# Patient Record
Sex: MALE | Race: WHITE | NOT HISPANIC OR LATINO | Employment: FULL TIME | URBAN - METROPOLITAN AREA
[De-identification: names, ages, dates, MRNs, and addresses within clinical notes are randomized per-mention and may not be internally consistent; named-entity substitution may affect disease eponyms.]

---

## 2024-11-07 ENCOUNTER — APPOINTMENT (OUTPATIENT)
Age: 19
End: 2024-11-07

## 2024-11-07 ENCOUNTER — CONSULT (OUTPATIENT)
Age: 19
End: 2024-11-07
Payer: COMMERCIAL

## 2024-11-07 VITALS
OXYGEN SATURATION: 98 % | BODY MASS INDEX: 33.04 KG/M2 | HEART RATE: 77 BPM | HEIGHT: 71 IN | SYSTOLIC BLOOD PRESSURE: 104 MMHG | WEIGHT: 236 LBS | DIASTOLIC BLOOD PRESSURE: 70 MMHG

## 2024-11-07 DIAGNOSIS — R19.7 DIARRHEA, UNSPECIFIED TYPE: ICD-10-CM

## 2024-11-07 DIAGNOSIS — R10.9 ABDOMINAL PAIN, UNSPECIFIED ABDOMINAL LOCATION: ICD-10-CM

## 2024-11-07 DIAGNOSIS — R11.0 NAUSEA: Primary | ICD-10-CM

## 2024-11-07 PROCEDURE — 99204 OFFICE O/P NEW MOD 45 MIN: CPT | Performed by: INTERNAL MEDICINE

## 2024-11-07 RX ORDER — ONDANSETRON 4 MG/1
4 TABLET, FILM COATED ORAL EVERY 8 HOURS PRN
Qty: 20 TABLET | Refills: 0 | Status: SHIPPED | OUTPATIENT
Start: 2024-11-07

## 2024-11-07 RX ORDER — DICYCLOMINE HYDROCHLORIDE 10 MG/1
10 CAPSULE ORAL 4 TIMES DAILY PRN
Qty: 120 CAPSULE | Refills: 0 | Status: SHIPPED | OUTPATIENT
Start: 2024-11-07

## 2024-11-07 RX ORDER — OMEPRAZOLE 40 MG/1
40 CAPSULE, DELAYED RELEASE ORAL DAILY
Qty: 90 CAPSULE | Refills: 0 | Status: SHIPPED | OUTPATIENT
Start: 2024-11-07

## 2024-11-07 NOTE — PROGRESS NOTES
"[facility] - Outpatient Note  Kody Skinner 19 y.o. adult MRN: [id number]  Encounter: [id number]      ASSESSMENT AND PLAN:    Kody Skinner is a 19 y.o. old pleasant adult with PMH of ADHD, autism, obesity who presents for followup    Abdominal pain  Nausea-it seems like his abdominal pain is related to his incident where a motorized cart hit his abdomen.  This is likely musculoskeletal in nature.  However he does state it sometimes is worse with certain foods.  There could be a gastrointestinal component.  Overall he is a poor historian.  Check stool H. Pylori  Start omeprazole 40 mg daily after stool H. pylori sent  Check right upper quad ultrasound  Zofran as needed  Bentyl as needed  Counseled on musculoskeletal treatment including stretching and heating pad  Consider EGD in the future if he does not respond to above measures    Diarrhea-3 soft looser stools for the past 4 weeks.  Differential is broad  Check C. difficile and stool chemistry panel  Counseled on keeping food diary  He can try lactose-free diet  If symptoms persist can perform colonoscopy as below    Hematochezia-2 episodes which I suspect are hemorrhoidal.  Fortunately since resolved.  Recent hemoglobin normal.  Can plan for colonoscopy in the future if returns          There are no diagnoses linked to this encounter.  ______________________________________________________________________    SUBJECTIVE:      On and off diarrhea for months and the got \"serious\" a nd consistent for one week. 3-4 BM's per day, had two episode bleeding bright red blood in the toilet blood and possibly on stool    Very picky eater, eats unhealthy, chicken tenders,eat pizza, pizza bagels, frozen foods, PB+J    Had incident at [employer] at work last month as  where cart hit his stomach and had pain and stomach aches since then, not related to moving, no related to eating? Except with large portions    No significant nausea/vomiting    No " "medications    No fhx colon cancer    No endosocpy or colonoscopy     I reviewed prior external notes    I reviewed previous lab results and images      REVIEW OF SYSTEMS:     REVIEW OF ALL OTHER SYSTEMS IS OTHERWISE NEGATIVE.      Historical Information   Past Medical History:   Diagnosis Date    ADHD (attention deficit hyperactivity disorder)     Autism      History reviewed. No pertinent surgical history.  Social History   Social History     Substance and Sexual Activity   Alcohol Use Yes    Comment: Rare     Social History     Substance and Sexual Activity   Drug Use Never     Social History     Tobacco Use   Smoking Status Never   Smokeless Tobacco Never     History reviewed. No pertinent family history.    Meds/Allergies     No current outpatient medications on file.    No Known Allergies        Objective     Blood pressure 104/70, pulse 77, height 5' 11\" (1.803 m), weight 107 kg (236 lb), SpO2 98%. Body mass index is 32.92 kg/m².      PHYSICAL EXAM:      General Appearance:   Alert, cooperative, no distress   HEENT:   Normocephalic, atraumatic, anicteric.     Neck:  Supple, symmetrical, trachea midline   Lungs:   Clear to auscultation bilaterally; no rales, rhonchi or wheezing; respirations unlabored    Heart::   Regular rate and rhythm; no murmur, rub, or gallop.   Abdomen:   Soft, non-tender, non-distended; normal bowel sounds; no masses, no organomegaly    Genitalia:   Deferred    Rectal:   Deferred    Extremities:  No cyanosis, clubbing or edema    Pulses:  2+ and symmetric    Skin:  No jaundice, rashes, or lesions    Lymph nodes:  No palpable cervical lymphadenopathy        Lab Results:   No visits with results within 1 Day(s) from this visit.   Latest known visit with results is:   No results found for any previous visit.         Radiology Results:   CTA abdomen pelvis w wo contrast    Result Date: 10/8/2024  Narrative: CT SCAN ABDOMEN AND PELVIS WITH CONTRAST:   TECHNIQUE: CT scan of the abdomen and " pelvis was performed with intravenous contrast. Coronal and sagittal reformations obtained.   Dose reduction techniques including automated exposure control and adjustment of the mA and/or kV according to patient's size were utilized. HISTORY: Abdominal pain. PRIORS:  None. FINDINGS: VISUALIZED LUNG BASES:  Normal.   LIVER: Normal density.  No measurable mass.   GALLBLADDER AND BILIARY TRACT: No radiodense calculus or dilatation.   PANCREAS: Normal density. No abnormal calcifications or inflammatory process.   SPLEEN: Normal.   KIDNEYS: Normal size, contour and axis without evidence of hydronephrosis.  No mass or cyst seen.   ADRENAL GLANDS: Normal.   AORTA:  Abdominal portion non-dilated.   BLADDER: Symmetric distention. No gross wall thickening.   BOWEL: No bowel obstruction or wall thickening. Appendix is not identified. No CT evidence of appendicitis seen.   REPRODUCTIVE: Unremarkable.   PERITONEAL CAVITY:  No ascites, fluid collection or mesenteric inflammation.  No lymphadenopathy.   BONES: No suspicious lytic or blastic bony lesions. OTHER FINDINGS: None.    Impression: 1. Unremarkable CT abdomen and pelvis with contrast.

## 2024-11-13 ENCOUNTER — HOSPITAL ENCOUNTER (OUTPATIENT)
Dept: RADIOLOGY | Facility: HOSPITAL | Age: 19
Discharge: HOME | End: 2024-11-13
Payer: COMMERCIAL

## 2024-11-13 DIAGNOSIS — R11.0 NAUSEA: ICD-10-CM

## 2024-11-13 DIAGNOSIS — R10.9 ABDOMINAL PAIN, UNSPECIFIED ABDOMINAL LOCATION: ICD-10-CM

## 2024-11-13 PROCEDURE — 76705 ECHO EXAM OF ABDOMEN: CPT

## 2024-11-18 ENCOUNTER — APPOINTMENT (OUTPATIENT)
Age: 19
End: 2024-11-18
Payer: COMMERCIAL

## 2024-11-18 ENCOUNTER — RESULTS FOLLOW-UP (OUTPATIENT)
Age: 19
End: 2024-11-18

## 2024-11-18 DIAGNOSIS — R19.7 DIARRHEA, UNSPECIFIED TYPE: ICD-10-CM

## 2024-11-18 DIAGNOSIS — R10.9 ABDOMINAL PAIN, UNSPECIFIED ABDOMINAL LOCATION: ICD-10-CM

## 2024-11-18 PROCEDURE — 87338 HPYLORI STOOL AG IA: CPT

## 2024-11-18 PROCEDURE — 87505 NFCT AGENT DETECTION GI: CPT

## 2024-11-19 LAB
C COLI+JEJUNI TUF STL QL NAA+PROBE: NEGATIVE
C DIFF TOX GENS STL QL NAA+PROBE: NEGATIVE
EC STX1+STX2 GENES STL QL NAA+PROBE: NEGATIVE
SALMONELLA SP SPAO STL QL NAA+PROBE: NEGATIVE
SHIGELLA SP+EIEC IPAH STL QL NAA+PROBE: NEGATIVE

## 2024-11-20 LAB — H PYLORI AG STL QL IA: NEGATIVE

## 2025-01-09 ENCOUNTER — OFFICE VISIT (OUTPATIENT)
Age: 20
End: 2025-01-09
Payer: COMMERCIAL

## 2025-01-09 VITALS
BODY MASS INDEX: 32.62 KG/M2 | DIASTOLIC BLOOD PRESSURE: 80 MMHG | OXYGEN SATURATION: 98 % | HEIGHT: 71 IN | WEIGHT: 233 LBS | HEART RATE: 88 BPM | SYSTOLIC BLOOD PRESSURE: 122 MMHG

## 2025-01-09 DIAGNOSIS — K92.1 HEMATOCHEZIA: ICD-10-CM

## 2025-01-09 DIAGNOSIS — K52.9 CHRONIC DIARRHEA: Primary | ICD-10-CM

## 2025-01-09 DIAGNOSIS — R11.0 NAUSEA: ICD-10-CM

## 2025-01-09 DIAGNOSIS — E66.811 OBESITY (BMI 30.0-34.9): ICD-10-CM

## 2025-01-09 DIAGNOSIS — R10.9 ABDOMINAL PAIN, UNSPECIFIED ABDOMINAL LOCATION: ICD-10-CM

## 2025-01-09 PROCEDURE — 99214 OFFICE O/P EST MOD 30 MIN: CPT | Performed by: INTERNAL MEDICINE

## 2025-01-09 NOTE — PROGRESS NOTES
[facility] - Outpatient Note  Kody Skinner 20 y.o. adult MRN: [id number]  Encounter: [id number]      ASSESSMENT AND PLAN:    Kody Skinner is a 20 y.o. old pleasant adult with PMH of ADHD, autism, obesity, who presents for followup    Abdominal pain, nausea-completely resolved after omeprazole course.  He has weaned himself off.  Previous H. pylori, right upper quadrant ultrasound were all unremarkable.  Continue off PPI  Monitor clinically    Chronic diarrhea-ranges from 1-5 bowel movements per day usually within minutes of eating indicating gastrocolic reflex suspicious for IBS.  Previous C. difficile and stool enteric bacterial panel negative.  Check further stool studies  Trial Metamucil  Increase fluid and fiber in diet  Can use Imodium as needed  I suspect a component of lactose intolerance so can consider dairy free diet in the future  Overall has poor diet so this is likely contributing to his symptoms.  Counseled him on improving diet.  Consider colonoscopy as below though he would like to hold off for now    Hematochezia-rare intermittent with wiping  I offered patient colonoscopy however he would like to hold off    Obesity-mild with BMI of 32  Counseled on diet, exercise and weight loss      There are no diagnoses linked to this encounter.  ______________________________________________________________________    SUBJECTIVE:      Abdominal pain, nausea has resolved      Diarrhea, most days of the week, loose stool, 1-5 times, brown, soemtmes green, more BM's in afternoon, goes within 15-20 mins, feels better after eating, sometimes worse with airy    HTCZ with wipining          I reviewed prior external notes    I reviewed previous lab results and images      REVIEW OF SYSTEMS:     REVIEW OF ALL OTHER SYSTEMS IS OTHERWISE NEGATIVE.      Historical Information   Past Medical History:   Diagnosis Date    ADHD (attention deficit hyperactivity disorder)     Autism      No past surgical  "history on file.  Social History   Social History     Substance and Sexual Activity   Alcohol Use Yes    Comment: Rare     Social History     Substance and Sexual Activity   Drug Use Never     Social History     Tobacco Use   Smoking Status Never   Smokeless Tobacco Never     No family history on file.    Meds/Allergies       Current Outpatient Medications:     dicyclomine (BENTYL) 10 mg capsule    omeprazole (PriLOSEC) 40 MG capsule    ondansetron (ZOFRAN) 4 mg tablet    No Known Allergies        Objective     Blood pressure 122/80, pulse 88, height 5' 11\" (1.803 m), weight 106 kg (233 lb), SpO2 98%. Body mass index is 32.5 kg/m².      PHYSICAL EXAM:      General Appearance:   Alert, cooperative, no distress   HEENT:   Normocephalic, atraumatic, anicteric.     Neck:  Supple, symmetrical, trachea midline   Lungs:   Clear to auscultation bilaterally; no rales, rhonchi or wheezing; respirations unlabored    Heart::   Regular rate and rhythm; no murmur, rub, or gallop.   Abdomen:   Soft, non-tender, non-distended; normal bowel sounds; no masses, no organomegaly    Genitalia:   Deferred    Rectal:   Deferred    Extremities:  No cyanosis, clubbing or edema    Pulses:  2+ and symmetric    Skin:  No jaundice, rashes, or lesions    Lymph nodes:  No palpable cervical lymphadenopathy        Lab Results:   No visits with results within 1 Day(s) from this visit.   Latest known visit with results is:   Appointment on 11/18/2024   Component Date Value    H. Pylori Stool Antigen 11/18/2024 Negative     Salmonella sp PCR 11/18/2024 Negative     Shigella sp/Enteroinvasi* 11/18/2024 Negative     Campylobacter sp (jejuni* 11/18/2024 Negative     Shiga toxin 1/Shiga toxi* 11/18/2024 Negative     C.difficile toxin by PCR 11/18/2024 Negative          Radiology Results:   No results found.    "